# Patient Record
Sex: FEMALE
[De-identification: names, ages, dates, MRNs, and addresses within clinical notes are randomized per-mention and may not be internally consistent; named-entity substitution may affect disease eponyms.]

---

## 2017-06-05 ENCOUNTER — CHARTING TRANS (OUTPATIENT)
Dept: OTHER | Age: 82
End: 2017-06-05

## 2017-06-12 ENCOUNTER — MYAURORA ACCOUNT LINK (OUTPATIENT)
Dept: OTHER | Age: 82
End: 2017-06-12

## 2017-06-12 ENCOUNTER — CHARTING TRANS (OUTPATIENT)
Dept: INTERNAL MEDICINE | Age: 82
End: 2017-06-12

## 2017-06-13 ENCOUNTER — LAB SERVICES (OUTPATIENT)
Dept: OTHER | Age: 82
End: 2017-06-13

## 2017-06-13 LAB
ALBUMIN SERPL BCG-MCNC: 4.2 G/DL (ref 3.6–5.1)
ALP SERPL-CCNC: 112 U/L (ref 45–105)
ALT SERPL W/O P-5'-P-CCNC: 5 U/L (ref 7–34)
AST SERPL-CCNC: 18 U/L (ref 9–37)
BILIRUB SERPL-MCNC: 0.5 MG/DL (ref 0–1)
BUN SERPL-MCNC: 14 MG/DL (ref 7–20)
CALCIUM SERPL-MCNC: 9.2 MG/DL (ref 8.6–10.6)
CHLORIDE SERPL-SCNC: 109 MMOL/L (ref 96–107)
CHOLEST SERPL-MCNC: 145 MG/DL (ref 140–200)
CREATININE, SERUM: 0.7 MG/DL (ref 0.5–1.4)
GFR SERPL CREATININE-BSD FRML MDRD: >60 ML/MIN/{1.73M2}
GFR SERPL CREATININE-BSD FRML MDRD: >60 ML/MIN/{1.73M2}
GLUCOSE P FAST SERPL-MCNC: 85 MG/DL (ref 60–100)
HCO3 SERPL-SCNC: 26 MMOL/L (ref 22–32)
HDLC SERPL-MCNC: 27 MG/DL
LDLC SERPL CALC-MCNC: 77 MG/DL (ref 0–100)
POTASSIUM SERPL-SCNC: 3.4 MMOL/L (ref 3.5–5.3)
PROT SERPL-MCNC: 6.8 G/DL (ref 6.2–8.1)
SODIUM SERPL-SCNC: 148 MMOL/L (ref 136–146)
TRIGL SERPL-MCNC: 207 MG/DL (ref 0–200)

## 2017-06-14 ENCOUNTER — CHARTING TRANS (OUTPATIENT)
Dept: OTHER | Age: 82
End: 2017-06-14

## 2017-06-22 ENCOUNTER — LAB SERVICES (OUTPATIENT)
Dept: OTHER | Age: 82
End: 2017-06-22

## 2017-06-22 LAB
BUN SERPL-MCNC: 13 MG/DL (ref 7–20)
CALCIUM SERPL-MCNC: 10 MG/DL (ref 8.6–10.6)
CHLORIDE SERPL-SCNC: 107 MMOL/L (ref 96–107)
CREATININE, SERUM: 0.8 MG/DL (ref 0.5–1.4)
GFR SERPL CREATININE-BSD FRML MDRD: >60 ML/MIN/{1.73M2}
GFR SERPL CREATININE-BSD FRML MDRD: >60 ML/MIN/{1.73M2}
GLUCOSE SERPL-MCNC: 90 MG/DL (ref 70–200)
HCO3 SERPL-SCNC: 30 MMOL/L (ref 22–32)
POTASSIUM SERPL-SCNC: 3.5 MMOL/L (ref 3.5–5.3)
SODIUM SERPL-SCNC: 147 MMOL/L (ref 136–146)

## 2017-07-01 ENCOUNTER — CHARTING TRANS (OUTPATIENT)
Dept: OTHER | Age: 82
End: 2017-07-01

## 2017-07-06 ENCOUNTER — CHARTING TRANS (OUTPATIENT)
Dept: OTHER | Age: 82
End: 2017-07-06

## 2017-07-25 ENCOUNTER — CHARTING TRANS (OUTPATIENT)
Dept: OTHER | Age: 82
End: 2017-07-25

## 2017-07-26 ENCOUNTER — MYAURORA ACCOUNT LINK (OUTPATIENT)
Dept: OTHER | Age: 82
End: 2017-07-26

## 2017-07-26 ENCOUNTER — CHARTING TRANS (OUTPATIENT)
Dept: INTERNAL MEDICINE | Age: 82
End: 2017-07-26

## 2017-08-02 ENCOUNTER — CHARTING TRANS (OUTPATIENT)
Dept: OTHER | Age: 82
End: 2017-08-02

## 2017-08-07 ENCOUNTER — LAB SERVICES (OUTPATIENT)
Dept: OTHER | Age: 82
End: 2017-08-07

## 2017-08-07 LAB
BILIRUBIN URINE: NEGATIVE
BLOOD URINE: NEGATIVE
CLARITY: CLEAR
COLOR: ABNORMAL
GLUCOSE QUALITATIVE U: NEGATIVE
KETONES, URINE: NEGATIVE
LEUKOCYTE ESTERASE URINE: ABNORMAL
NITRITE URINE: NEGATIVE
PH URINE: 6 (ref 5–7)
SPECIFIC GRAVITY URINE: 1.01 (ref 1–1.03)
URINE PROTEIN, QUAL (DIPSTICK): NEGATIVE
UROBILINOGEN URINE: <2

## 2017-08-08 ENCOUNTER — CHARTING TRANS (OUTPATIENT)
Dept: FAMILY MEDICINE | Age: 82
End: 2017-08-08

## 2017-08-08 ENCOUNTER — MYAURORA ACCOUNT LINK (OUTPATIENT)
Dept: OTHER | Age: 82
End: 2017-08-08

## 2017-08-09 ENCOUNTER — LAB SERVICES (OUTPATIENT)
Dept: OTHER | Age: 82
End: 2017-08-09

## 2017-08-09 ENCOUNTER — BH HISTORICAL (OUTPATIENT)
Dept: OTHER | Age: 82
End: 2017-08-09

## 2017-08-09 LAB
BUN SERPL-MCNC: 10 MG/DL (ref 7–20)
CALCIUM SERPL-MCNC: 10 MG/DL (ref 8.6–10.6)
CHLORIDE SERPL-SCNC: 103 MMOL/L (ref 96–107)
CHOLEST SERPL-MCNC: 150 MG/DL (ref 140–200)
CREATININE, SERUM: 0.7 MG/DL (ref 0.5–1.4)
FINAL REPORT: NORMAL
GFR SERPL CREATININE-BSD FRML MDRD: >60 ML/MIN/{1.73M2}
GFR SERPL CREATININE-BSD FRML MDRD: >60 ML/MIN/{1.73M2}
GLUCOSE P FAST SERPL-MCNC: 93 MG/DL (ref 60–100)
HCO3 SERPL-SCNC: 30 MMOL/L (ref 22–32)
HDLC SERPL-MCNC: 31 MG/DL
POTASSIUM SERPL-SCNC: 4.4 MMOL/L (ref 3.5–5.3)
SODIUM SERPL-SCNC: 141 MMOL/L (ref 136–146)
TRIGL SERPL-MCNC: 292 MG/DL (ref 0–200)
TSH SERPL DL<=0.05 MIU/L-ACNC: 1.27 M[IU]/L (ref 0.3–4.82)
VIT B12 SERPL-MCNC: 323 PG/ML (ref 193–982)

## 2017-08-10 ENCOUNTER — CHARTING TRANS (OUTPATIENT)
Dept: OTHER | Age: 82
End: 2017-08-10

## 2017-08-11 ENCOUNTER — BH HISTORICAL (OUTPATIENT)
Dept: OTHER | Age: 82
End: 2017-08-11

## 2017-08-17 ENCOUNTER — CHARTING TRANS (OUTPATIENT)
Dept: OTHER | Age: 82
End: 2017-08-17

## 2017-08-23 ENCOUNTER — CHARTING TRANS (OUTPATIENT)
Dept: OTHER | Age: 82
End: 2017-08-23

## 2017-09-07 ENCOUNTER — BH HISTORICAL (OUTPATIENT)
Dept: OTHER | Age: 82
End: 2017-09-07

## 2017-09-14 ENCOUNTER — BH HISTORICAL (OUTPATIENT)
Dept: OTHER | Age: 82
End: 2017-09-14

## 2017-09-15 ENCOUNTER — LAB SERVICES (OUTPATIENT)
Dept: OTHER | Age: 82
End: 2017-09-15

## 2017-09-15 ENCOUNTER — CHARTING TRANS (OUTPATIENT)
Dept: OTHER | Age: 82
End: 2017-09-15

## 2017-09-15 LAB
ALBUMIN SERPL BCG-MCNC: 4.5 G/DL (ref 3.6–5.1)
ALP SERPL-CCNC: 130 U/L (ref 45–105)
ALT SERPL W/O P-5'-P-CCNC: 24 U/L (ref 15–43)
AST SERPL-CCNC: 25 U/L (ref 14–43)
BASOPHIL %: 0.5 % (ref 0–1.2)
BASOPHIL ABSOLUTE #: 0 10*3/UL (ref 0–0.1)
BILIRUB SERPL-MCNC: 0.3 MG/DL (ref 0–1.3)
BILIRUBIN URINE: NEGATIVE
BLOOD URINE: NEGATIVE
BUN SERPL-MCNC: 12 MG/DL (ref 7–20)
CALCIUM SERPL-MCNC: 9.6 MG/DL (ref 8.6–10.6)
CHLORIDE SERPL-SCNC: 103 MMOL/L (ref 96–107)
CLARITY: CLEAR
COLOR: YELLOW
CREATININE, SERUM: 0.7 MG/DL (ref 0.5–1.4)
DIFFERENTIAL TYPE: NORMAL
EOSINOPHIL %: 4 % (ref 0–10)
EOSINOPHIL ABSOLUTE #: 0.3 10*3/UL (ref 0–0.5)
GFR SERPL CREATININE-BSD FRML MDRD: >60 ML/MIN/{1.73M2}
GFR SERPL CREATININE-BSD FRML MDRD: >60 ML/MIN/{1.73M2}
GLUCOSE QUALITATIVE U: NEGATIVE
GLUCOSE SERPL-MCNC: 86 MG/DL (ref 70–200)
HCO3 SERPL-SCNC: 29 MMOL/L (ref 22–32)
HEMATOCRIT: 38.3 % (ref 34–45)
HEMOGLOBIN: 13.1 G/DL (ref 11.2–15.7)
KETONES, URINE: NEGATIVE
LEUKOCYTE ESTERASE URINE: ABNORMAL
LYMPH PERCENT: 22.7 % (ref 20.5–51.1)
LYMPHOCYTE ABSOLUTE #: 1.5 10*3/UL (ref 1.2–3.4)
MEAN CORPUSCULAR HGB CONCENTRATION: 34.2 % (ref 32–36)
MEAN CORPUSCULAR HGB: 31.3 PG (ref 27–34)
MEAN CORPUSCULAR VOLUME: 91.4 FL (ref 79–95)
MEAN PLATELET VOLUME: 10 FL (ref 8.6–12.4)
MONOCYTE ABSOLUTE #: 0.5 10*3/UL (ref 0.2–0.9)
MONOCYTE PERCENT: 6.9 % (ref 4.3–12.9)
NEUTROPHIL ABSOLUTE #: 4.3 10*3/UL (ref 1.4–6.5)
NEUTROPHIL PERCENT: 65.9 % (ref 34–73.5)
NITRITE URINE: NEGATIVE
PH URINE: 6 (ref 5–7)
PLATELET COUNT: 219 10*3/UL (ref 150–400)
POTASSIUM SERPL-SCNC: 4.3 MMOL/L (ref 3.5–5.3)
PROT SERPL-MCNC: 7.6 G/DL (ref 6.4–8.5)
RED BLOOD CELL COUNT: 4.19 10*6/UL (ref 3.7–5.2)
RED CELL DISTRIBUTION WIDTH: 13.6 % (ref 11.3–14.8)
SODIUM SERPL-SCNC: 145 MMOL/L (ref 136–146)
SPECIFIC GRAVITY URINE: 1.01 (ref 1–1.03)
TSH SERPL DL<=0.05 MIU/L-ACNC: 2.75 M[IU]/L (ref 0.3–4.82)
URINE PROTEIN, QUAL (DIPSTICK): NEGATIVE
UROBILINOGEN URINE: <2
VIT B12 SERPL-MCNC: 342 PG/ML (ref 193–982)
WHITE BLOOD CELL COUNT: 6.6 10*3/UL (ref 4–10)

## 2017-09-16 LAB — FINAL REPORT: NORMAL

## 2017-09-18 ENCOUNTER — BH HISTORICAL (OUTPATIENT)
Dept: OTHER | Age: 82
End: 2017-09-18

## 2017-09-26 ENCOUNTER — BH HISTORICAL (OUTPATIENT)
Dept: OTHER | Age: 82
End: 2017-09-26

## 2017-10-03 ENCOUNTER — CHARTING TRANS (OUTPATIENT)
Dept: OTHER | Age: 82
End: 2017-10-03

## 2017-10-05 ENCOUNTER — CHARTING TRANS (OUTPATIENT)
Dept: FAMILY MEDICINE | Age: 82
End: 2017-10-05

## 2017-10-05 ENCOUNTER — CHARTING TRANS (OUTPATIENT)
Dept: OTHER | Age: 82
End: 2017-10-05

## 2017-10-06 ENCOUNTER — CHARTING TRANS (OUTPATIENT)
Dept: OTHER | Age: 82
End: 2017-10-06

## 2017-10-17 ENCOUNTER — CHARTING TRANS (OUTPATIENT)
Dept: OTHER | Age: 82
End: 2017-10-17

## 2017-10-26 ENCOUNTER — CHARTING TRANS (OUTPATIENT)
Dept: GASTROENTEROLOGY | Age: 82
End: 2017-10-26

## 2017-10-26 ENCOUNTER — MYAURORA ACCOUNT LINK (OUTPATIENT)
Dept: OTHER | Age: 82
End: 2017-10-26

## 2017-10-30 ENCOUNTER — CHARTING TRANS (OUTPATIENT)
Dept: OTHER | Age: 82
End: 2017-10-30

## 2017-11-27 ENCOUNTER — BH HISTORICAL (OUTPATIENT)
Dept: OTHER | Age: 82
End: 2017-11-27

## 2017-11-27 ENCOUNTER — CHARTING TRANS (OUTPATIENT)
Dept: FAMILY MEDICINE | Age: 82
End: 2017-11-27

## 2017-12-03 ENCOUNTER — IMAGING SERVICES (OUTPATIENT)
Dept: OTHER | Age: 82
End: 2017-12-03

## 2017-12-13 ENCOUNTER — CHARTING TRANS (OUTPATIENT)
Dept: OTHER | Age: 82
End: 2017-12-13

## 2017-12-27 ENCOUNTER — CHARTING TRANS (OUTPATIENT)
Dept: OTHER | Age: 82
End: 2017-12-27

## 2018-01-22 ENCOUNTER — CHARTING TRANS (OUTPATIENT)
Dept: OTHER | Age: 83
End: 2018-01-22

## 2018-01-25 ENCOUNTER — CHARTING TRANS (OUTPATIENT)
Dept: OTHER | Age: 83
End: 2018-01-25

## 2018-04-04 ENCOUNTER — CHARTING TRANS (OUTPATIENT)
Dept: OTHER | Age: 83
End: 2018-04-04

## 2018-05-01 ENCOUNTER — CHARTING TRANS (OUTPATIENT)
Dept: OTHER | Age: 83
End: 2018-05-01

## 2018-05-02 ENCOUNTER — CHARTING TRANS (OUTPATIENT)
Dept: OTHER | Age: 83
End: 2018-05-02

## 2018-05-02 ENCOUNTER — LAB SERVICES (OUTPATIENT)
Dept: OTHER | Age: 83
End: 2018-05-02

## 2018-05-02 LAB
ALBUMIN SERPL-MCNC: 4.1 G/DL (ref 3.6–5.1)
ALP SERPL-CCNC: 129 U/L (ref 45–105)
ALT SERPL-CCNC: 19 U/L (ref 15–43)
AST SERPL-CCNC: 25 U/L (ref 14–43)
BASOPHIL %: 0.6 % (ref 0–1.2)
BASOPHIL ABSOLUTE #: 0 10*3/UL (ref 0–0.1)
BILIRUB SERPL-MCNC: 0.3 MG/DL (ref 0–1.3)
BUN SERPL-MCNC: 11 MG/DL (ref 7–20)
CALCIUM SERPL-MCNC: 9.3 MG/DL (ref 8.6–10.6)
CHLORIDE SERPL-SCNC: 104 MMOL/L (ref 96–107)
CO2 SERPL-SCNC: 29 MMOL/L (ref 22–32)
CREAT SERPL-MCNC: 0.7 MG/DL (ref 0.5–1.4)
DIFFERENTIAL TYPE: NORMAL
EOSINOPHIL %: 1.9 % (ref 0–10)
EOSINOPHIL ABSOLUTE #: 0.1 10*3/UL (ref 0–0.5)
GFR SERPL CREATININE-BSD FRML MDRD: >60 ML/MIN/{1.73M2}
GFR SERPL CREATININE-BSD FRML MDRD: >60 ML/MIN/{1.73M2}
GLUCOSE SERPL-MCNC: 96 MG/DL (ref 70–200)
HEMATOCRIT: 38.4 % (ref 34–45)
HEMOGLOBIN: 12.8 G/DL (ref 11.2–15.7)
LYMPH PERCENT: 21.7 % (ref 20.5–51.1)
LYMPHOCYTE ABSOLUTE #: 1.5 10*3/UL (ref 1.2–3.4)
MEAN CORPUSCULAR HGB CONCENTRATION: 33.3 % (ref 32–36)
MEAN CORPUSCULAR HGB: 31.3 PG (ref 27–34)
MEAN CORPUSCULAR VOLUME: 93.9 FL (ref 79–95)
MEAN PLATELET VOLUME: 10.4 FL (ref 8.6–12.4)
MONOCYTE ABSOLUTE #: 0.5 10*3/UL (ref 0.2–0.9)
MONOCYTE PERCENT: 6.6 % (ref 4.3–12.9)
NEUTROPHIL ABSOLUTE #: 4.8 10*3/UL (ref 1.4–6.5)
NEUTROPHIL PERCENT: 69.2 % (ref 34–73.5)
PLATELET COUNT: 164 10*3/UL (ref 150–400)
POTASSIUM SERPL-SCNC: 3.2 MMOL/L (ref 3.5–5.3)
PROT SERPL-MCNC: 6.7 G/DL (ref 6.4–8.5)
RED BLOOD CELL COUNT: 4.09 10*6/UL (ref 3.7–5.2)
RED CELL DISTRIBUTION WIDTH: 14.4 % (ref 11.3–14.8)
SODIUM SERPL-SCNC: 148 MMOL/L (ref 136–146)
WHITE BLOOD CELL COUNT: 7 10*3/UL (ref 4–10)

## 2018-05-04 ENCOUNTER — CHARTING TRANS (OUTPATIENT)
Dept: OTHER | Age: 83
End: 2018-05-04

## 2018-05-07 ENCOUNTER — CHARTING TRANS (OUTPATIENT)
Dept: OTHER | Age: 83
End: 2018-05-07

## 2018-05-14 ENCOUNTER — LAB SERVICES (OUTPATIENT)
Dept: OTHER | Age: 83
End: 2018-05-14

## 2018-05-14 LAB
BUN SERPL-MCNC: 16 MG/DL (ref 7–20)
CALCIUM SERPL-MCNC: 9.7 MG/DL (ref 8.6–10.6)
CHLORIDE SERPL-SCNC: 106 MMOL/L (ref 96–107)
CO2 SERPL-SCNC: 29 MMOL/L (ref 22–32)
CREAT SERPL-MCNC: 0.7 MG/DL (ref 0.5–1.4)
GFR SERPL CREATININE-BSD FRML MDRD: >60 ML/MIN/{1.73M2}
GFR SERPL CREATININE-BSD FRML MDRD: >60 ML/MIN/{1.73M2}
GLUCOSE SERPL-MCNC: 91 MG/DL (ref 70–200)
POTASSIUM SERPL-SCNC: 4 MMOL/L (ref 3.5–5.3)
SODIUM SERPL-SCNC: 149 MMOL/L (ref 136–146)

## 2018-05-23 ENCOUNTER — CHARTING TRANS (OUTPATIENT)
Dept: OTHER | Age: 83
End: 2018-05-23

## 2018-05-30 ENCOUNTER — CHARTING TRANS (OUTPATIENT)
Dept: OTHER | Age: 83
End: 2018-05-30

## 2018-06-19 ENCOUNTER — CHARTING TRANS (OUTPATIENT)
Dept: OTHER | Age: 83
End: 2018-06-19

## 2018-07-12 ENCOUNTER — CHARTING TRANS (OUTPATIENT)
Dept: OTHER | Age: 83
End: 2018-07-12

## 2018-08-14 ENCOUNTER — CHARTING TRANS (OUTPATIENT)
Dept: OTHER | Age: 83
End: 2018-08-14

## 2018-08-23 ENCOUNTER — CHARTING TRANS (OUTPATIENT)
Dept: OTHER | Age: 83
End: 2018-08-23

## 2018-09-12 ENCOUNTER — CHARTING TRANS (OUTPATIENT)
Dept: OTHER | Age: 83
End: 2018-09-12

## 2018-11-23 ENCOUNTER — IMAGING SERVICES (OUTPATIENT)
Dept: OTHER | Age: 83
End: 2018-11-23

## 2018-11-28 VITALS
BODY MASS INDEX: 22.84 KG/M2 | DIASTOLIC BLOOD PRESSURE: 60 MMHG | WEIGHT: 121 LBS | HEIGHT: 61 IN | HEART RATE: 72 BPM | SYSTOLIC BLOOD PRESSURE: 136 MMHG | TEMPERATURE: 97.9 F

## 2018-11-28 VITALS
SYSTOLIC BLOOD PRESSURE: 100 MMHG | TEMPERATURE: 97.7 F | WEIGHT: 125 LBS | HEART RATE: 79 BPM | DIASTOLIC BLOOD PRESSURE: 60 MMHG | OXYGEN SATURATION: 97 %

## 2018-11-28 VITALS
SYSTOLIC BLOOD PRESSURE: 124 MMHG | RESPIRATION RATE: 16 BRPM | BODY MASS INDEX: 21.52 KG/M2 | DIASTOLIC BLOOD PRESSURE: 56 MMHG | WEIGHT: 114 LBS | HEIGHT: 61 IN | HEART RATE: 72 BPM

## 2018-11-28 VITALS
HEIGHT: 60 IN | WEIGHT: 117 LBS | TEMPERATURE: 97.7 F | SYSTOLIC BLOOD PRESSURE: 130 MMHG | HEART RATE: 64 BPM | BODY MASS INDEX: 22.97 KG/M2 | DIASTOLIC BLOOD PRESSURE: 60 MMHG

## 2018-11-28 VITALS — WEIGHT: 123 LBS | HEART RATE: 66 BPM | SYSTOLIC BLOOD PRESSURE: 132 MMHG | DIASTOLIC BLOOD PRESSURE: 64 MMHG

## 2018-11-28 VITALS
HEIGHT: 60 IN | DIASTOLIC BLOOD PRESSURE: 84 MMHG | HEART RATE: 72 BPM | WEIGHT: 126 LBS | BODY MASS INDEX: 24.74 KG/M2 | SYSTOLIC BLOOD PRESSURE: 118 MMHG

## 2019-01-24 ENCOUNTER — IMAGING SERVICES (OUTPATIENT)
Dept: OTHER | Age: 84
End: 2019-01-24

## 2019-03-06 VITALS
HEIGHT: 60 IN | WEIGHT: 126 LBS | BODY MASS INDEX: 24.74 KG/M2 | SYSTOLIC BLOOD PRESSURE: 146 MMHG | HEART RATE: 72 BPM | DIASTOLIC BLOOD PRESSURE: 70 MMHG | TEMPERATURE: 97.9 F

## 2025-03-27 ENCOUNTER — APPOINTMENT (OUTPATIENT)
Dept: CT IMAGING | Facility: HOSPITAL | Age: OVER 89
End: 2025-03-27
Attending: EMERGENCY MEDICINE
Payer: MEDICARE

## 2025-03-27 ENCOUNTER — HOSPITAL ENCOUNTER (EMERGENCY)
Facility: HOSPITAL | Age: OVER 89
Discharge: HOME | End: 2025-03-27
Attending: EMERGENCY MEDICINE
Payer: MEDICARE

## 2025-03-27 VITALS
WEIGHT: 100 LBS | DIASTOLIC BLOOD PRESSURE: 71 MMHG | HEART RATE: 91 BPM | RESPIRATION RATE: 16 BRPM | SYSTOLIC BLOOD PRESSURE: 121 MMHG | TEMPERATURE: 99 F | OXYGEN SATURATION: 95 %

## 2025-03-27 DIAGNOSIS — S00.03XA CONTUSION OF SCALP, INITIAL ENCOUNTER: Primary | ICD-10-CM

## 2025-03-27 PROCEDURE — 72125 CT NECK SPINE W/O DYE: CPT | Performed by: EMERGENCY MEDICINE

## 2025-03-27 PROCEDURE — 99284 EMERGENCY DEPT VISIT MOD MDM: CPT

## 2025-03-27 PROCEDURE — 70450 CT HEAD/BRAIN W/O DYE: CPT | Performed by: EMERGENCY MEDICINE

## 2025-03-27 RX ORDER — MINERAL OIL AND PETROLATUM 150; 830 MG/G; MG/G
OINTMENT OPHTHALMIC 3 TIMES DAILY
COMMUNITY

## 2025-03-27 RX ORDER — AMLODIPINE BESYLATE 10 MG/1
10 TABLET ORAL DAILY
COMMUNITY

## 2025-03-27 RX ORDER — ASPIRIN 81 MG/1
81 TABLET, CHEWABLE ORAL DAILY
COMMUNITY

## 2025-03-27 RX ORDER — SERTRALINE HYDROCHLORIDE 25 MG/1
25 TABLET, FILM COATED ORAL DAILY
COMMUNITY

## 2025-03-27 RX ORDER — CHOLESTYRAMINE LIGHT 4 G/5.7G
4 POWDER, FOR SUSPENSION ORAL
COMMUNITY

## 2025-03-27 RX ORDER — ACETAMINOPHEN 500 MG
1000 TABLET ORAL ONCE
Status: COMPLETED | OUTPATIENT
Start: 2025-03-27 | End: 2025-03-27

## 2025-03-27 RX ORDER — PANCRELIPASE 30000; 6000; 19000 [USP'U]/1; [USP'U]/1; [USP'U]/1
CAPSULE, DELAYED RELEASE PELLETS ORAL
COMMUNITY

## 2025-03-27 NOTE — ED INITIAL ASSESSMENT (HPI)
Arrives from [facility] c/o witnessed fall today PTA. Pt fell and hit tile floor with staff member. No blood thinners, denies LOC. Does have swelling to left posterior head. Pt denies complaints. AXO1, which is pt's baseline.

## 2025-03-27 NOTE — ED QUICK NOTES
Pt wheeled to bathroom by this RN. Was able to independently get out of wheel chair and turn to sit on toilet. Wheeled back to ED Cart.    Cart in low position, wheels locked. In view of nurses station.

## 2025-03-27 NOTE — ED PROVIDER NOTES
Patient Seen in: [facility]      History     Chief Complaint   Patient presents with    Fall     Stated Complaint: FALL    Subjective:   HPI      89-year-old female with past medical history of dementia presents today following a fall.  It was reported to me that patient slipped and fell while at staff members at her facility.  Patient presently reports a discomfort in her posterior scalp area.  She denies any other pain or injury.    Objective:     Past Medical History:    Dementia (HCC)    Depression    Esophageal reflux    Essential hypertension    Exocrine pancreatic insufficiency (HCC)    Hyperlipidemia    Spinal stenosis    Unspecified disorder of vestibular function, unspecified ear              History reviewed. No pertinent surgical history.             Social History     Socioeconomic History    Marital status:    Tobacco Use    Smoking status: Unknown   Vaping Use    Vaping status: Never Used   Substance and Sexual Activity    Alcohol use: Not Currently    Drug use: Not Currently                  Physical Exam     ED Triage Vitals [03/27/25 1450]   /72   Pulse 81   Resp 18   Temp 99 °F (37.2 °C)   Temp src Temporal   SpO2 95 %   O2 Device None (Room air)       Current Vitals:   Vital Signs  BP: 121/71  Pulse: 91  Resp: 16  Temp: 99 °F (37.2 °C)  Temp src: Temporal    Oxygen Therapy  SpO2: 95 %  O2 Device: None (Room air)        Physical Exam  Vitals and nursing note reviewed.   Constitutional:       Appearance: Normal appearance.   HENT:      Head: Normocephalic.      Comments: Left posterior parietal scalp hematoma     Nose: Nose normal.      Mouth/Throat:      Mouth: Mucous membranes are moist.   Eyes:      Extraocular Movements: Extraocular movements intact.   Cardiovascular:      Rate and Rhythm: Normal rate.   Pulmonary:      Effort: Pulmonary effort is normal.   Abdominal:      General: Abdomen is flat.   Musculoskeletal:         General: No tenderness or  deformity. Normal range of motion.      Cervical back: No tenderness.   Skin:     General: Skin is warm.   Neurological:      General: No focal deficit present.      Mental Status: She is alert.      Cranial Nerves: No cranial nerve deficit.      Sensory: No sensory deficit.      Motor: No weakness.      Coordination: Coordination normal.   Psychiatric:         Mood and Affect: Mood normal.         ED Course   Labs Reviewed - No data to display         CT SPINE CERVICAL (CPT=72125)    Result Date: 3/27/2025  PROCEDURE:  CT SPINE CERVICAL (CPT=72125)  COMPARISON:  None.  INDICATIONS:  FALL  TECHNIQUE:  Noncontrast CT scanning of the cervical spine is performed from the skull base through C7.  Multiplanar reconstructions are generated.  Dose reduction techniques were used. Dose information is transmitted to the ACR (American College of Radiology) NRDR (National Radiology Data Registry) which includes the Dose Index Registry.  PATIENT STATED HISTORY: (As transcribed by Technologist)  Patient fell and hit the back of her head.    FINDINGS:   No acute displaced osseous fracture or malalignment.  Moderate to severe degenerative changes in the cervical spine.  CPPD noted at the C1-C2 articulation.  Assessment of the cervical spinal canal is limited secondary to CT technique.  There is a focus of ossification at the right aspect of the upper cervical spinal canal at the C1-C2 level which may represent calcified atherosclerotic plaque.  Scattered atherosclerosis is noted within the neck.  There is atelectasis and/or scarring at the lung apices.   Assessment lung apices is limited secondary to respiratory motion.  Nonspecific lucency at the floor of the left middle cranial fossa is nonspecific but may represent arachnoid granulations.  Scattered dental caries.            CONCLUSION:   1. No acute displaced osseous fracture is identified.  2. Nonspecific lucency at the floor of the left middle cranial fossa is nonspecific but  could represent arachnoid granulations.  Correlation with prior outside imaging is suggested.  In the absence of prior head imaging, a nonemergent outpatient follow-up brain MRI with and without intravenous contrast is recommended for further assessment of this finding as clinically appropriate given the patient's age.  3. Scattered dental caries.  Correlation with dental exam is recommended.  4. Please see above for further details.     LOCATION:  [facility]   Dictated by (CST): Stromberg, LeRoy, MD on 3/27/2025 at 5:33 PM     Finalized by (CST): Stromberg, LeRoy, MD on 3/27/2025 at 5:40 PM       CT BRAIN OR HEAD (CPT=70450)    Result Date: 3/27/2025  PROCEDURE:  CT BRAIN OR HEAD (74111)  COMPARISON:  None.  INDICATIONS:  FALL.  Head pain.  TECHNIQUE:  Noncontrast CT scanning is performed through the brain. Dose reduction techniques were used. Dose information is transmitted to the ACR (American College of Radiology) NRDR (National Radiology Data Registry) which includes the Dose Index Registry.  PATIENT STATED HISTORY: (As transcribed by Technologist)  Patient fell and hit the back of her head.    FINDINGS:  Mild to moderate diffuse volume loss.  Mild to moderate age indeterminate small vessel ischemic disease.  There is no midline shift or mass-effect.  The basal cisterns are patent.  The gray-white matter differentiation is intact.  There is no acute intracranial hemorrhage or extra-axial fluid collection.   There is no evident fracture.  The visualized paranasal sinuses and mastoid air cells are unremarkable.  There is a prominent contusion to the superior left scalp.            CONCLUSION:  1. No acute intracranial hemorrhage or hydrocephalus. 2. Mild to moderate age indeterminate small vessel ischemic disease. If there is clinical concern for acute ischemia/infarction, an MRI of the brain would be recommended for further evaluation.    LOCATION:  [facility]   Dictated by (CST): Stromberg, LeRoy, MD on 3/27/2025 at  5:30 PM     Finalized by (CST): Stromberg, LeRoy, MD on 3/27/2025 at 5:33 PM             [facility]      Differential Diagnosis  89-year-old female presents today for evaluation following a witnessed fall.  Patient is awake within normal neuroexam.  She has a left posterior parietal hematoma.  She localized the discomfort to her occiput and upper neck.  Neuroexam unremarkable.  Differential would include scalp hematoma, intracranial hemorrhage, skull fracture, cervical spine fracture.  Plan for CT, will reassess.    5:43 pm  Imaging does not demonstrate acute abnormality.  I informed patient of the chronic findings and need for follow-up, including dental caries.  Will discharge back to her facility.        Medical Decision Making      Disposition and Plan     Clinical Impression:  1. Contusion of scalp, initial encounter         Disposition:  Discharge  3/27/2025  5:45 pm    Follow-up:  Lakhwinder Sierra MD  [address] [id number] [phone]    Follow up in 1 day(s)      Arely Mark DO  [address] [facility] [address]  [facility] [address] [phone]    Call in 1 day(s)            Medications Prescribed:  Discharge Medication List as of 3/27/2025  5:46 PM              Supplementary Documentation:

## 2025-03-27 NOTE — DISCHARGE INSTRUCTIONS
Follow-up with your primary care doctor within the next week for reassessment. Return for any concerning symptoms.    Follow-up with a neurologist for the findings below.    Nonspecific lucency at the floor of the left middle cranial fossa is nonspecific but could represent arachnoid granulations.  Correlation with prior outside imaging is suggested.  In the absence of prior head imaging, a nonemergent outpatient   follow-up brain MRI with and without intravenous contrast is recommended for further assessment of this finding as clinically appropriate given the patient's age.     Follow up with a dentist, cavities were noted on your imaging.

## 2025-03-27 NOTE — ED QUICK NOTES
Rounding Completed    Plan of Care reviewed. Waiting for CT results.  Elimination needs assessed. Resp even and unlabored.      Bed is locked and in lowest position. In front of nurses station.